# Patient Record
Sex: MALE
[De-identification: names, ages, dates, MRNs, and addresses within clinical notes are randomized per-mention and may not be internally consistent; named-entity substitution may affect disease eponyms.]

---

## 2019-03-09 ENCOUNTER — HOSPITAL ENCOUNTER (OUTPATIENT)
Dept: HOSPITAL 88 - OR | Age: 64
Discharge: HOME | End: 2019-03-09
Attending: INTERNAL MEDICINE
Payer: COMMERCIAL

## 2019-03-09 VITALS — DIASTOLIC BLOOD PRESSURE: 89 MMHG | SYSTOLIC BLOOD PRESSURE: 121 MMHG

## 2019-03-09 DIAGNOSIS — K64.8: ICD-10-CM

## 2019-03-09 DIAGNOSIS — Z12.11: Primary | ICD-10-CM

## 2019-03-09 DIAGNOSIS — K22.2: ICD-10-CM

## 2019-03-09 DIAGNOSIS — R12: ICD-10-CM

## 2019-03-09 DIAGNOSIS — C15.9: ICD-10-CM

## 2019-03-09 DIAGNOSIS — R13.10: ICD-10-CM

## 2019-03-09 PROCEDURE — 45330 DIAGNOSTIC SIGMOIDOSCOPY: CPT

## 2019-03-09 PROCEDURE — 93005 ELECTROCARDIOGRAM TRACING: CPT

## 2019-03-09 PROCEDURE — 43239 EGD BIOPSY SINGLE/MULTIPLE: CPT

## 2019-03-09 NOTE — XMS REPORT
Patient Summary Document

                             Created on: 2019



LORENZO LYNN

External Reference #: [id number]

: 1955

Sex: Male



Demographics







                          Address                   [address] [place]  [address]

 

                          Home Phone                [phone]

 

                          Preferred Language        Unknown

 

                          Marital Status            Unknown

 

                          Oriental orthodox Affiliation     Unknown

 

                          Race                      Unknown

 

                          Ethnic Group              Unknown





Author







                          Author                    [facility]

 

                          [facility]

 

                          Address                   Unknown

 

                          Phone                     Unavailable







Support







                Name            Relationship    Address         Phone

 

                    MARTINEZJOSEFINA WAGNERNDA    PRS                 [address] [place]  [address]                      [phone]

 

                    HERVE MARTINEZ    PRS                 [address] [place], [place]  [address] [phone]







Care Team Providers







                    Care Team Member Name    Role                Phone

 

                          Unavailable               Unavailable







Payers







             Payer Name    Policy Type    Policy Number    Effective Date    Expiration Date







Problems

This patient has no known problems.



Allergies, Adverse Reactions, Alerts







          Allergy Name    Allergy Type    Status    Severity    Reaction(s)    Onset Date    Inactive 

Date                      Treating Clinician        Comments

 

        No Known Allergies    DA      Active    U               2018 00:00:00                     







Medications

This patient has no known medications.

## 2019-03-10 NOTE — OPERATIVE REPORT
DATE OF PROCEDURE:  03/09/2019

 

SURGEON:  Alan Vargas MD

 

PROCEDURES:  Esophagogastroduodenoscopy with biopsies and colonoscopy to 30 mL from the

anal verge. 

 

INDICATIONS FOR EGD:  Dysphagia to solids and liquids.

 

INDICATIONS FOR COLONOSCOPY:  Colorectal cancer screening.

 

MEDICATION:  The patient was done under MAC, please see anesthesiologist's note.

 

PROCEDURE IN DETAIL:  With the patient in left lateral decubitus position, a flexible

fiberoptic Olympus gastroscope was introduced into the esophagus under direct

visualization without any difficulty.  An obstructing tumor was noted at 25 cm from the

incisors.  Biopsies were obtained.  The scope was advanced with gentle persistent

pressure just beyond that area, but distally the lumen was tight and the scope could not

be advanced any further.  The scope was subsequently withdrawn.  The patient tolerated

the procedure well. 

 

IMPRESSION:  Obstructing lesion at 25 cm from the incisors.  Biopsies obtained.

 

PLAN:  Follow up histology.  The patient will need a repeat EGD with dilatation over a

wire under fluoroscopic guidance.  CT scan of chest and abdomen.  Oncology consult.  The

patient was then turned around after adequate lubrication of the anal canal.  A flexible

fiberoptic Olympus colonoscope was inserted into the rectum with ease and advanced to

approximately 30 cm from the incisors.  The scope was not advanced any further as the

prep was very poor.  The scope was subsequently withdrawn.  No obstructing or

constricting lesions were noted.  The scope was then retroflexed into the distal rectum.

 Small internal hemorrhoids were noted, none of which was actively bleeding.  The scope

was then straightened out and was subsequently withdrawn.  The patient tolerated the

procedure well. 

 

IMPRESSION:  

1. Colonoscopy to 30 cm from the anal verge.  The scope was not advanced any further

secondary to a very poor prep. 

2. Internal hemorrhoids, none actively bleeding.

 

PLAN:  The patient will need a repeat colonoscopy after a better prep.

 

 

 

 

______________________________

Alan Vargas MD

 

[facility] [place]

D:  03/09/2019 16:31:35

T:  03/10/2019 06:46:57

Job #:  [id number]

## 2019-03-11 ENCOUNTER — HOSPITAL ENCOUNTER (OUTPATIENT)
Dept: HOSPITAL 88 - CT | Age: 64
End: 2019-03-11
Attending: INTERNAL MEDICINE
Payer: COMMERCIAL

## 2019-03-11 DIAGNOSIS — K22.8: Primary | ICD-10-CM

## 2019-03-11 LAB
BUN SERPL-MCNC: 12 MG/DL (ref 7–26)
BUN/CREAT SERPL: 9 (ref 6–25)
EGFRCR SERPLBLD CKD-EPI 2021: 57 ML/MIN (ref 60–?)

## 2019-03-11 PROCEDURE — 84520 ASSAY OF UREA NITROGEN: CPT

## 2019-03-11 PROCEDURE — 96360 HYDRATION IV INFUSION INIT: CPT

## 2019-03-11 PROCEDURE — 36415 COLL VENOUS BLD VENIPUNCTURE: CPT

## 2019-03-11 PROCEDURE — 71260 CT THORAX DX C+: CPT

## 2019-03-11 PROCEDURE — 74160 CT ABDOMEN W/CONTRAST: CPT

## 2019-03-11 PROCEDURE — 82565 ASSAY OF CREATININE: CPT

## 2019-03-11 NOTE — DIAGNOSTIC IMAGING REPORT
EXAM: CT Chest, Abdomen WITH contrast  

INDICATION:      ^ESOPHAGEAL MASS 

COMPARISON: None.

TECHNIQUE: Chest, abdomen were scanned utilizing a multidetector helical

scanner from the lung apex to the iliac crest after administration of IV

contrast. Coronal and sagittal reformations were obtained. Routine protocol was

performed. Scan was performed when during portal venous phase.

     IV CONTRAST: 100 mL of Isovue 370

     ORAL CONTRAST: Water

            

COMPLICATIONS: None



RADIATION DOSE:

     Total DLP: 353.85 mGy*cm

     Estimated effective dose: (DLP x 0.015 x size factor) mSv

     CTDIvol has been reviewed. It is below the limits set by the Radiation

Protocol Committee (RPC).

     Dose modulation, iterative reconstruction, and/or weight based adjustment

of the mA/kV was utilized to reduce the radiation dose to as low as reasonably

achievable. 



FINDINGS:



LINES and TUBES: None.



LUNGS AND AIRWAYS:  5.5 mm nodule in the left lower lobe (series 4, image 74).

Bilateral lower lobe atelectasis. Airways are normal.



PLEURA: The pleural spaces are clear.



HEART AND MEDIASTINUM: The thyroid gland is normal.  No mediastinal, hilar or

axillary lymphadenopathy.  The heart is normal in size. There is no pericardial

effusion.     



HEPATOBILIARY: The liver is diffuse hypodense compared to the spleen,

consistent with diffuse hepatic diffuse hepatic steatosis.  No focal hepatic

lesions.  No biliary ductal dilation. 



GALLBLADDER: No radio-opaque stones or sludge.  No wall thickening.



SPLEEN: No splenomegaly. 



PANCREAS: No focal masses or ductal dilatation.  



ADRENALS: No adrenal nodules    



KIDNEYS/URETERS: Kidneys enhance symmetrically.  No hydronephrosis. Large

hydronephrosis versus large parapelvic cysts replacing majority of the right

kidney. The remaining parenchyma demonstrates symmetric enhancement of the left

kidney.  No stones.



GI TRACT: 

Small sliding hiatal hernia. Circumferential intraluminal esophageal mass

measuring at least 6.6 cm in craniocaudal dimension and measuring at least 2.1

cm in thickness. The lesion starts above the francesca at the level of the main

pulmonary artery and extends inferiorly. The saphenous distally demonstrates

wall thickening, likely related to esophagitis from reflux. There is adjacent

lymphadenopathy measuring 1.2 cm (series 2 image 31).



No abnormal distention, wall thickening, or evidence of bowel obstruction.     

 





LYMPH NODES: No lymphadenopathy.



VESSELS: Unremarkable.



PERITONEUM / RETROPERITONEUM: No free air or fluid.



BONES: Unremarkable.



SOFT TISSUES: Unremarkable.            



IMPRESSION: 

1.  Circumferential intraluminal esophageal mass measuring 6.6 cm in

craniocaudal dimension extending from the level of the main pulmonary artery

and extending inferiorly. There is adjacent single prominent regional lymph

nodes.

2.  Small sliding hiatal hernia with associated distal esophageal esophagitis.

3.  Single 5.5 mm left lower lobe pulmonary nodule.



Signed by: Dr. Richard Noel M.D. on 3/11/2019 9:10 PM

## 2019-03-12 ENCOUNTER — HOSPITAL ENCOUNTER (EMERGENCY)
Dept: HOSPITAL 88 - ER | Age: 64
Discharge: HOME | End: 2019-03-12
Payer: COMMERCIAL

## 2019-03-12 VITALS — BODY MASS INDEX: 26.33 KG/M2 | HEIGHT: 65 IN | WEIGHT: 158 LBS

## 2019-03-12 VITALS — SYSTOLIC BLOOD PRESSURE: 151 MMHG | DIASTOLIC BLOOD PRESSURE: 98 MMHG

## 2019-03-12 DIAGNOSIS — R33.9: ICD-10-CM

## 2019-03-12 DIAGNOSIS — N39.0: ICD-10-CM

## 2019-03-12 DIAGNOSIS — N40.1: ICD-10-CM

## 2019-03-12 DIAGNOSIS — N13.9: Primary | ICD-10-CM

## 2019-03-12 LAB
ALBUMIN SERPL-MCNC: 3.6 G/DL (ref 3.5–5)
ALBUMIN/GLOB SERPL: 1.1 {RATIO} (ref 0.8–2)
ALP SERPL-CCNC: 77 IU/L (ref 40–150)
ALT SERPL-CCNC: < 6 IU/L (ref 0–55)
ANION GAP SERPL CALC-SCNC: 11.6 MMOL/L (ref 8–16)
BACTERIA URNS QL MICRO: (no result) /HPF
BASOPHILS # BLD AUTO: 0 10*3/UL (ref 0–0.1)
BASOPHILS NFR BLD AUTO: 0.7 % (ref 0–1)
BILIRUB UR QL: NEGATIVE
BUN SERPL-MCNC: 12 MG/DL (ref 7–26)
BUN/CREAT SERPL: 10 (ref 6–25)
CALCIUM SERPL-MCNC: 9 MG/DL (ref 8.4–10.2)
CHLORIDE SERPL-SCNC: 102 MMOL/L (ref 98–107)
CLARITY UR: (no result)
CO2 SERPL-SCNC: 27 MMOL/L (ref 22–29)
COLOR UR: (no result)
DEPRECATED NEUTROPHILS # BLD AUTO: 3.9 10*3/UL (ref 2.1–6.9)
EGFRCR SERPLBLD CKD-EPI 2021: > 60 ML/MIN (ref 60–?)
EOSINOPHIL # BLD AUTO: 0.1 10*3/UL (ref 0–0.4)
EOSINOPHIL NFR BLD AUTO: 0.9 % (ref 0–6)
EPI CELLS URNS QL MICRO: (no result) /LPF
ERYTHROCYTE [DISTWIDTH] IN CORD BLOOD: 13.6 % (ref 11.7–14.4)
GLOBULIN PLAS-MCNC: 3.2 G/DL (ref 2.3–3.5)
GLUCOSE SERPLBLD-MCNC: 99 MG/DL (ref 74–118)
HCT VFR BLD AUTO: 37.2 % (ref 38.2–49.6)
HGB BLD-MCNC: 12 G/DL (ref 14–18)
KETONES UR QL STRIP.AUTO: (no result)
LEUKOCYTE ESTERASE UR QL STRIP.AUTO: NEGATIVE
LYMPHOCYTES # BLD: 1 10*3/UL (ref 1–3.2)
LYMPHOCYTES NFR BLD AUTO: 17.3 % (ref 18–39.1)
MCH RBC QN AUTO: 28.4 PG (ref 28–32)
MCHC RBC AUTO-ENTMCNC: 32.3 G/DL (ref 31–35)
MCV RBC AUTO: 87.9 FL (ref 81–99)
MONOCYTES # BLD AUTO: 0.5 10*3/UL (ref 0.2–0.8)
MONOCYTES NFR BLD AUTO: 9.6 % (ref 4.4–11.3)
NEUTS SEG NFR BLD AUTO: 70.2 % (ref 38.7–80)
NITRITE UR QL STRIP.AUTO: POSITIVE
PLATELET # BLD AUTO: 247 X10E3/UL (ref 140–360)
POTASSIUM SERPL-SCNC: 4.6 MMOL/L (ref 3.5–5.1)
PROT UR QL STRIP.AUTO: (no result)
RBC # BLD AUTO: 4.23 X10E6/UL (ref 4.3–5.7)
SODIUM SERPL-SCNC: 136 MMOL/L (ref 136–145)
SP GR UR STRIP: 1.01 (ref 1.01–1.02)
UROBILINOGEN UR STRIP-MCNC: 0.2 MG/DL (ref 0.2–1)
WBC #/AREA URNS HPF: (no result) /HPF (ref 0–5)

## 2019-03-12 PROCEDURE — 36415 COLL VENOUS BLD VENIPUNCTURE: CPT

## 2019-03-12 PROCEDURE — 85025 COMPLETE CBC W/AUTO DIFF WBC: CPT

## 2019-03-12 PROCEDURE — 81001 URINALYSIS AUTO W/SCOPE: CPT

## 2019-03-12 PROCEDURE — 82550 ASSAY OF CK (CPK): CPT

## 2019-03-12 PROCEDURE — 99284 EMERGENCY DEPT VISIT MOD MDM: CPT

## 2019-03-12 PROCEDURE — 80053 COMPREHEN METABOLIC PANEL: CPT

## 2019-03-12 PROCEDURE — 87086 URINE CULTURE/COLONY COUNT: CPT

## 2019-03-12 PROCEDURE — 51700 IRRIGATION OF BLADDER: CPT

## 2019-03-22 ENCOUNTER — HOSPITAL ENCOUNTER (OUTPATIENT)
Dept: HOSPITAL 88 - OR | Age: 64
Discharge: HOME | End: 2019-03-22
Attending: SURGERY
Payer: COMMERCIAL

## 2019-03-22 VITALS — DIASTOLIC BLOOD PRESSURE: 84 MMHG | SYSTOLIC BLOOD PRESSURE: 140 MMHG

## 2019-03-22 DIAGNOSIS — F41.9: ICD-10-CM

## 2019-03-22 DIAGNOSIS — N39.0: ICD-10-CM

## 2019-03-22 DIAGNOSIS — D64.9: ICD-10-CM

## 2019-03-22 DIAGNOSIS — Z87.442: ICD-10-CM

## 2019-03-22 DIAGNOSIS — I45.10: ICD-10-CM

## 2019-03-22 DIAGNOSIS — C15.9: Primary | ICD-10-CM

## 2019-03-22 DIAGNOSIS — Z45.2: ICD-10-CM

## 2019-03-22 DIAGNOSIS — R42: ICD-10-CM

## 2019-03-22 DIAGNOSIS — F17.200: ICD-10-CM

## 2019-03-22 DIAGNOSIS — K21.9: ICD-10-CM

## 2019-03-22 PROCEDURE — 36561 INSERT TUNNELED CV CATH: CPT

## 2019-03-22 PROCEDURE — 77001 FLUOROGUIDE FOR VEIN DEVICE: CPT

## 2019-03-22 NOTE — OPERATIVE REPORT
DATE OF PROCEDURE:  03/22/2019

 

SURGEON:  Vinod Nicolas MD

 

PREOPERATIVE DIAGNOSIS:  Carcinoma of the esophagus, needing IV access for chemotherapy.

 

POSTOPERATIVE DIAGNOSIS:  Carcinoma of the esophagus, needing IV access for chemotherapy.

 

OPERATION PERFORMED:  Placement of left subclavian venous access port under C-arm

guidance. 

 

ASSISTANT:  JOVITA Hu.

 

ANESTHESIA:  General.

 

COMPLICATIONS:  None.

 

ESTIMATED BLOOD LOSS:  Minimal.

 

DESCRIPTION OF PROCEDURE:  With the patient lying in bed in the supine position under

good general endotracheal anesthesia, the left chest and neck were prepped with Betadine

solution and draped in the usual manner.  The patient was placed in Trendelenburg

position and a standard left subclavian venipuncture was performed without any

difficulty and a guidewire was advanced into central venous position using the C-arm.

The tip of the guidewire was confirmed to be above the level of the superior vena cava

and the left lung was fully expanded.  A pocket was then created in the left anterior

chest to accept the reservoir and the catheter was then threaded to the subclavian

position.  The reservoir was anchored to the anterior chest wall with interrupted

sutures of 2-0 silk.  The reservoir and catheter were fully heparinized and the catheter

was cut to the appropriate length.  The peel-away sheath introducer was then placed over

the guidewire.  The guidewire was removed and the catheter was threaded through the

peel-away sheath introducer and the peel-away sheath introducer was removed without any

difficulty.  There was good blood return and the Port-A-Cath was fully heparinized.

Using the C-arm, the tip of the catheter was confirmed to be at the level of the

superior vena cava and the left lung was fully expanded.  The wounds were then closed in

layers.  The subcutaneous tissue was approximated with interrupted sutures of 3-0 and

4-0 Vicryl and the skin was closed with subcuticular 5-0 Vicryl.  Benzoin, Steri-Strips,

and dressings were applied.  The sponge, lap, and needle count was correct.  The patient

tolerated the procedure well and returned to the recovery room in stable condition. 

 

 

 

 

______________________________

MD BHAVANA WynneR/MODL

D:  03/22/2019 15:34:44

T:  03/22/2019 21:51:43

Job #:  [id number]